# Patient Record
Sex: FEMALE | Race: WHITE | URBAN - METROPOLITAN AREA
[De-identification: names, ages, dates, MRNs, and addresses within clinical notes are randomized per-mention and may not be internally consistent; named-entity substitution may affect disease eponyms.]

---

## 2019-03-25 ENCOUNTER — OFFICE VISIT (OUTPATIENT)
Dept: URBAN - METROPOLITAN AREA CLINIC 16 | Facility: CLINIC | Age: 54
End: 2019-03-25
Payer: COMMERCIAL

## 2019-03-25 DIAGNOSIS — Z96.1 PRESENCE OF PSEUDOPHAKIA: ICD-10-CM

## 2019-03-25 DIAGNOSIS — B00.9 HERPESVIRAL INFECTION, UNSPECIFIED: Primary | ICD-10-CM

## 2019-03-25 PROCEDURE — 92002 INTRM OPH EXAM NEW PATIENT: CPT | Performed by: OPTOMETRIST

## 2019-03-25 ASSESSMENT — INTRAOCULAR PRESSURE
OD: 10
OS: 10

## 2019-03-25 NOTE — IMPRESSION/PLAN
Impression: Herpesviral infection, unspecified: B00.9. without ocular complication Plan: Discussed diagnosis in detail with patient. Patient instructed to use artificial tears as needed. Continue using current medication(s).